# Patient Record
Sex: MALE | Race: WHITE | NOT HISPANIC OR LATINO | Employment: FULL TIME | URBAN - METROPOLITAN AREA
[De-identification: names, ages, dates, MRNs, and addresses within clinical notes are randomized per-mention and may not be internally consistent; named-entity substitution may affect disease eponyms.]

---

## 2022-04-13 ENCOUNTER — OFFICE VISIT (OUTPATIENT)
Dept: SURGERY | Facility: CLINIC | Age: 32
End: 2022-04-13

## 2022-04-13 VITALS — BODY MASS INDEX: 33.49 KG/M2 | WEIGHT: 221 LBS | HEIGHT: 68 IN

## 2022-04-13 DIAGNOSIS — L98.9 DISORDER OF SKIN OR SUBCUTANEOUS TISSUE: Primary | ICD-10-CM

## 2022-04-13 PROCEDURE — 99202 OFFICE O/P NEW SF 15 MIN: CPT | Performed by: SURGERY

## 2022-04-13 NOTE — PROGRESS NOTES
General Surgery History and Physical      Impression/Plann:     Luke Hsu is a 31 y.o. with abdominal bulge and (L98.9) Disorder of skin or subcutaneous tissue in the right upper quadrant.  I suspect this is related to his previous scar from his exploratory laparotomy as a child which causes his fat deposition to appear asymmetric.  No discrete mass on exam.  Recommend deferral of further imaging at this time and discussed weight loss for his BMI of 33.6.  He is beginning an exercise regimen.     Referring Provider: Anthony Rivers NP    Chief Complaint:    Abdominal bulge    History of Present Illness:    Mr. Luke Hsu is a 31 y.o. gentleman who presents for evaluation of a right upper quadrant abdominal bulge.  He has noticed increase in size of the bulge over the last several months and he notices this when leaning over vehicles while working at his job at the [employer].  He denies any symptoms of pain or discomfort.  He had a laparotomy as a child which he said was exploratory for possible liver pathology.    Past Medical History:   History reviewed. No pertinent past medical history.       Past Surgical History:    Past Surgical History:   Procedure Laterality Date   • EXPLORATORY LAPAROTOMY      8 months old       Family History:    History reviewed. No pertinent family history.      Social History:    Social History     Socioeconomic History   • Marital status: Single   Tobacco Use   • Smoking status: Never Smoker   • Smokeless tobacco: Current User     Types: Chew   Vaping Use   • Vaping Use: Never used   Substance and Sexual Activity   • Alcohol use: No   • Drug use: No   • Sexual activity: Defer     • Denies tobacco use  • Occasional alcohol use      Allergies:   No Known Allergies    Medications:   No current outpatient medications on file.    Radiology/Endoscopy:    • I reviewed his CT scan report from February 2022 but images are unavailable.  The report does not describe presence  of hernia or a discrete mass in the subcutaneous tissues.            Physical Exam:   • Constitutional: Well-developed well-nourished, no acute distress  • Eyes: Conjunctiva normal, sclera nonicteric  • ENMT: Hearing grossly normal, oral mucosa moist  • Neck: Supple, trachea midline  • Respiratory: No increased work of breathing, normal inspiratory effort  • Cardiovascular: Regular rate, no peripheral edema, no jugular venous distention  • Gastrointestinal: Soft, nontender, well-healed incision in the right upper quadrant, slight asymmetry with protrusion of fatty subcutaneous tissue superior to scar, no discrete mass present  • Lymphatics (palpable nodes):  cervical-negative, axillary-negative  • Skin:  Warm, dry, no rash on visualized skin surfaces  • Musculoskeletal: Symmetric strength, normal gait  • Psychiatric: Alert and oriented ×3, normal affect       Long Phan MD  General and Endoscopic Surgery  [facility]    [address] [place] [address], [address]  [place], [place], [address]  P: [phone]  F: [phone]     Transcribed from ambient dictation for Long Phan MD by Long Phan MD.  04/13/22   13:38 EDT